# Patient Record
Sex: FEMALE | Race: WHITE | URBAN - METROPOLITAN AREA
[De-identification: names, ages, dates, MRNs, and addresses within clinical notes are randomized per-mention and may not be internally consistent; named-entity substitution may affect disease eponyms.]

---

## 2018-02-27 ENCOUNTER — OFFICE VISIT (OUTPATIENT)
Dept: VASCULAR SURGERY | Facility: CLINIC | Age: 53
End: 2018-02-27
Payer: COMMERCIAL

## 2018-02-27 DIAGNOSIS — Z53.9 ERRONEOUS ENCOUNTER--DISREGARD: Primary | ICD-10-CM

## 2018-02-27 PROCEDURE — 99203 OFFICE O/P NEW LOW 30 MIN: CPT | Performed by: SURGERY

## 2018-02-27 NOTE — PROGRESS NOTES
[facility] Consultation    Alberta De Souza is a 52-year-old female who presents with recurrent right lower extremity pain and varicose veins.  She underwent multiple phlebectomies of her right lower extremity varicose veins on 10/26/2010.  About 2 years ago she noted recurrent pain in the anterolateral right thigh extending down the right lateral leg.  Additionally, she developed swelling of her right ankle toward the end of the day.  She describes her pain as an aching, tiredness and heaviness that is worse when she stands for long periods of time and improves with elevation of legs.  She uses Advil on an as-needed basis for the discomfort.  She has worn compression hose but has not noted significant improvement in her symptoms.  She has suffered complications of superficial thrombophlebitis of the right leg in the past.    She thinks she simply has to ignore the pain and keep going with her day.    She has no history of deep vein thrombosis or superficial thrombophlebitis.  Her family history is significant for varicose veins in her father and her brother.     Past medical history  Medical: Negative  Surgical: Varicose vein surgery as above    Medicines: None    Allergies: Tetracycline    Social history: She works as a , is a non-smoker and does not use alcohol.  She is a mother of 3 sons age 26, 24 and 18 years.    12 point review of systems was completed and was reviewed.  It is significant for a 5 pound weight loss but is otherwise as noted in the history of present illness and past medical history.    Physical exam  General: Pleasant, fit appearing female in no acute distress.  She is 5 feet 9 inches tall weighs 155 pounds  HEENT: Normocephalic, atraumatic.  EOMI.  External ears and nose are normal.  Respiratory: Normal respiratory effort  Cardiovascular: Pulse is regular  Psychiatric: Judgment, insight, mood and affect are normal  Musculoskeletal: Gait and station are normal.  The joints of  her fingers and toes are without deformity.  There is no cyanosis of her nailbeds.  Neurologic: Grossly normal  Extremities: She has 4-6 mm varicosities coursing down the mid to distal lateral thigh, across the lateral right popliteal fossa and onto the lateral right leg.  There are scattered telangiectasias about the right leg below the knee.  There are reticular veins extending up onto the anterolateral right thigh.  In the left lower extremity she has scattered telangiectasias and reticular veins but no significant varicose veins are appreciated.  There are no venous stasis changes.  There is 1+ edema of her right leg but I do not appreciate any edema of the left leg.  She has 3+ dorsalis pedis and posterior tibial pulses bilaterally.    I reviewed her operative report from 10/26/2010 as well as an ultrasound 8/11/2010.  This revealed no evidence of deep vein thrombosis.  The great saphenous vein, small saphenous vein are patent and competent.  There was no evidence of deep vein thrombosis or deep vein valve incompetence.    Impression  Recurrent right lower extremity pain and varicose veins; status post multiple stab phlebectomies 8 years ago.  We discussed options of continued conservative management with use of compression hose with small risk of superficial thrombophlebitis, bleeding or worsening of the varicose veins.    If she wishes to have treatment, she would need an ultrasound of her right lower extremity veins to assess for valve incompetence and to look for the source of these varicosities.  She will think about her options and let us know how she like to proceed.    DANIELLE Allison MD

## 2018-02-27 NOTE — MR AVS SNAPSHOT
"              After Visit Summary   2018    Alberta De Souza    MRN: [id number]           Patient Information     Date Of Birth          1965        Visit Information        Provider Department      2018 2:45 PM Gerson Allison MD [facility]      Today's Diagnoses     ERRONEOUS ENCOUNTER--DISREGARD    -  1       Follow-ups after your visit        Who to contact     If you have questions or need follow up information about today's clinic visit or your schedule please contact [facility] directly at [phone].  Normal or non-critical lab and imaging results will be communicated to you by Signosticshart, letter or phone within 4 business days after the clinic has received the results. If you do not hear from us within 7 days, please contact the clinic through Signosticshart or phone. If you have a critical or abnormal lab result, we will notify you by phone as soon as possible.  Submit refill requests through Sulia or call your pharmacy and they will forward the refill request to us. Please allow 3 business days for your refill to be completed.          Additional Information About Your Visit        MyChart Information     Sulia lets you send messages to your doctor, view your test results, renew your prescriptions, schedule appointments and more. To sign up, go to [email or web address] . Click on \"Log in\" on the left side of the screen, which will take you to the Welcome page. Then click on \"Sign up Now\" on the right side of the page.     You will be asked to enter the access code listed below, as well as some personal information. Please follow the directions to create your username and password.     Your access code is: [email or web address]  Expires: 2018  3:03 PM     Your access code will  in 90 days. If you need help or a new code, please call your [place] clinic or [phone].        Care EveryWhere ID     " This is your Care EveryWhere ID. This could be used by other organizations to access your [place] medical records  [id number]         Blood Pressure from Last 3 Encounters:   No data found for BP    Weight from Last 3 Encounters:   No data found for Wt              Today, you had the following     No orders found for display       Primary Care Provider Fax #    Provider Not In System [phone]                Equal Access to Services     [facility]: Hadii aad ku hadasho Soomaali, waaxda luqadaha, qaybta kaalmada adeegyada, yumiko peace brionnan adekaruna salvadorjohn donald . So [facility] [phone].    ATENCIÓN: Si habla español, tiene a santos disposición servicios gratuitos de asistencia lingüística. Llame al [phone].    We comply with applicable federal civil rights laws and [place] laws. We do not discriminate on the basis of race, color, national origin, age, disability, sex, sexual orientation, or gender identity.            Thank you!     Thank you for choosing [facility]  for your care. Our goal is always to provide you with excellent care. Hearing back from our patients is one way we can continue to improve our services. Please take a few minutes to complete the written survey that you may receive in the mail after your visit with us. Thank you!             Your Updated Medication List - Protect others around you: Learn how to safely use, store and throw away your medicines at www.disposemymeds.org.      Notice  As of 2/27/2018 11:59 PM    You have not been prescribed any medications.

## 2018-03-12 ENCOUNTER — OFFICE VISIT (OUTPATIENT)
Dept: VASCULAR SURGERY | Facility: CLINIC | Age: 53
End: 2018-03-12
Payer: COMMERCIAL

## 2018-03-12 ENCOUNTER — APPOINTMENT (OUTPATIENT)
Dept: VASCULAR SURGERY | Facility: CLINIC | Age: 53
End: 2018-03-12
Payer: COMMERCIAL

## 2018-03-12 DIAGNOSIS — Z53.9 ERRONEOUS ENCOUNTER--DISREGARD: Primary | ICD-10-CM

## 2018-03-12 PROCEDURE — 93971 EXTREMITY STUDY: CPT | Performed by: SURGERY

## 2018-03-12 PROCEDURE — 99213 OFFICE O/P EST LOW 20 MIN: CPT | Performed by: SURGERY

## 2018-03-12 NOTE — MR AVS SNAPSHOT
"              After Visit Summary   3/12/2018    Alberta De Souza    MRN: [id number]           Patient Information     Date Of Birth          1965        Visit Information        Provider Department      3/12/2018 1:30 PM Gerson Allison MD [facility]      Today's Diagnoses     ERRONEOUS ENCOUNTER--DISREGARD    -  1       Follow-ups after your visit        Who to contact     If you have questions or need follow up information about today's clinic visit or your schedule please contact [facility] directly at [phone].  Normal or non-critical lab and imaging results will be communicated to you by Easelhart, letter or phone within 4 business days after the clinic has received the results. If you do not hear from us within 7 days, please contact the clinic through Easelhart or phone. If you have a critical or abnormal lab result, we will notify you by phone as soon as possible.  Submit refill requests through Ratio or call your pharmacy and they will forward the refill request to us. Please allow 3 business days for your refill to be completed.          Additional Information About Your Visit        MyChart Information     Ratio lets you send messages to your doctor, view your test results, renew your prescriptions, schedule appointments and more. To sign up, go to [email or web address] . Click on \"Log in\" on the left side of the screen, which will take you to the Welcome page. Then click on \"Sign up Now\" on the right side of the page.     You will be asked to enter the access code listed below, as well as some personal information. Please follow the directions to create your username and password.     Your access code is: [email or web address]  Expires: 2018  3:03 PM     Your access code will  in 90 days. If you need help or a new code, please call your [place] clinic or [phone].        Care EveryWhere ID     " This is your Care EveryWhere ID. This could be used by other organizations to access your [place] medical records  [id number]         Blood Pressure from Last 3 Encounters:   No data found for BP    Weight from Last 3 Encounters:   No data found for Wt              Today, you had the following     No orders found for display       Primary Care Provider Fax #    Provider Not In System [phone]                Equal Access to Services     [facility]: Hadii aad ku hadasho Soomaali, waaxda luqadaha, qaybta kaalmada adeegyada, yumiko peace brionnan adekrauna salvadorjohn donald . So [facility] [phone].    ATENCIÓN: Si habla español, tiene a santos disposición servicios gratuitos de asistencia lingüística. Llame al [phone].    We comply with applicable federal civil rights laws and [place] laws. We do not discriminate on the basis of race, color, national origin, age, disability, sex, sexual orientation, or gender identity.            Thank you!     Thank you for choosing [facility]  for your care. Our goal is always to provide you with excellent care. Hearing back from our patients is one way we can continue to improve our services. Please take a few minutes to complete the written survey that you may receive in the mail after your visit with us. Thank you!             Your Updated Medication List - Protect others around you: Learn how to safely use, store and throw away your medicines at www.disposemymeds.org.      Notice  As of 3/12/2018 11:59 PM    You have not been prescribed any medications.

## 2018-03-14 NOTE — PROGRESS NOTES
[facility] Progress Note    Alberta De Souza returns in follow-up of right lower extremity pain and recurrent varicose veins.  Please see my consultation of 2/27/2018 for details.  She returned today for right lower extremity duplex ultrasound.    Duplex ultrasound of her right lower extremity deep veins reveals no evidence of deep vein thrombosis.  Her distal right femoral vein is incompetent but the remaining deep vein valves are competent.  Her right great saphenous vein is competent.  The right small saphenous vein is competent except for short segment at the distal calf.  The Vein of Giacomini is an incompetent from the distal thigh to the proximal calf but measures only 1.7 mm in diameter at its largest.  The anterior accessory saphenous vein is not visualized.  There is a 4.1 mm diameter incompetent  10 cm cephalad of the popliteal crease which supplies a 5.1mm incompetent varicosity coursing down the posterior lateral thigh onto the lateral aspect of the leg.    Impression  CEAP3 chronic venous insufficiency right lower extremity.  It appears that the majority of her symptoms originate from the large incompetent  in the posterolateral right thigh.  Due to the limited incompetence of the small saphenous vein and the very small size of the incompetent Vein of Giacomini, I do not feel they are contributing to her symptoms and are not clinically significant.    Options of continued conservative management with use of compression hose with risk of superficial thumb phlebitis, bleeding or worsening of varicose veins was discussed.  If she chose treatment, should be a candidate for radiofrequency ablation of the incompetent perforating vein with and without phlebectomies.  Details and risk were discussed.    She will think about her options and let us know how she like to proceed.    DANIELLE Allison MD

## 2018-04-30 ENCOUNTER — OFFICE VISIT (OUTPATIENT)
Dept: VASCULAR SURGERY | Facility: CLINIC | Age: 53
End: 2018-04-30
Payer: COMMERCIAL

## 2018-04-30 DIAGNOSIS — Z53.9 ERRONEOUS ENCOUNTER--DISREGARD: Primary | ICD-10-CM

## 2018-04-30 PROCEDURE — 99207 ZZC VEINSOLUTIONS NO CHARGE VISIT: CPT | Performed by: SURGERY

## 2018-04-30 NOTE — MR AVS SNAPSHOT
"              After Visit Summary   2018    Alberta De Souza    MRN: [id number]           Patient Information     Date Of Birth          1965        Visit Information        Provider Department      2018 3:00 PM Gerson Allison MD [facility]      Today's Diagnoses     ERRONEOUS ENCOUNTER--DISREGARD    -  1       Follow-ups after your visit        Who to contact     If you have questions or need follow up information about today's clinic visit or your schedule please contact [facility] directly at [phone].  Normal or non-critical lab and imaging results will be communicated to you by Weston Softwarehart, letter or phone within 4 business days after the clinic has received the results. If you do not hear from us within 7 days, please contact the clinic through Weston Softwarehart or phone. If you have a critical or abnormal lab result, we will notify you by phone as soon as possible.  Submit refill requests through LogicLadder or call your pharmacy and they will forward the refill request to us. Please allow 3 business days for your refill to be completed.          Additional Information About Your Visit        MyChart Information     LogicLadder lets you send messages to your doctor, view your test results, renew your prescriptions, schedule appointments and more. To sign up, go to [email or web address] . Click on \"Log in\" on the left side of the screen, which will take you to the Welcome page. Then click on \"Sign up Now\" on the right side of the page.     You will be asked to enter the access code listed below, as well as some personal information. Please follow the directions to create your username and password.     Your access code is: [email or web address]  Expires: 10/28/2018  3:36 PM     Your access code will  in 90 days. If you need help or a new code, please call your [place] clinic or [phone].        Care EveryWhere ID     " This is your Care EveryWhere ID. This could be used by other organizations to access your [place] medical records  [id number]         Blood Pressure from Last 3 Encounters:   No data found for BP    Weight from Last 3 Encounters:   No data found for Wt              Today, you had the following     No orders found for display       Primary Care Provider Fax #    Physician No Ref-Primary [phone]       No address on file        Equal Access to Services     [facility]: Hadii aad ku hadasho Soomaali, waaxda luqadaha, qaybta kaalmada adeegyada, waxay marthain hayaan adeeg staci laSandhyajose . So [facility] [phone].    ATENCIÓN: Si habla español, tiene a santos disposición servicios gratuitos de asistencia lingüística. Llame al [phone].    We comply with applicable federal civil rights laws and [place] laws. We do not discriminate on the basis of race, color, national origin, age, disability, sex, sexual orientation, or gender identity.            Thank you!     Thank you for choosing [facility]  for your care. Our goal is always to provide you with excellent care. Hearing back from our patients is one way we can continue to improve our services. Please take a few minutes to complete the written survey that you may receive in the mail after your visit with us. Thank you!             Your Updated Medication List - Protect others around you: Learn how to safely use, store and throw away your medicines at www.disposemymeds.org.      Notice  As of 4/30/2018 11:59 PM    You have not been prescribed any medications.

## 2018-04-30 NOTE — PROGRESS NOTES
"2018    [facility]   Individual  [employer]  [address] [address]  [place]  [address]    RE:  Alberta De Souza   1965      To Whom It May Concern:    I am writing regarding Alberta De Souza who was denied coverage for treatment of recurrent, symptomatic right lower extremity varicose veins.  Phlebectomies were performed in the same distribution as the recurrent varicose veins from which she is suffering presently.  Dictations from 2018 and 3/12/18 detail her symptoms.      It is clear based on her duplex ultrasound that a 4.1 mm incompetent perforating vein in the distal posterior lateral right thigh, 10 cm cephalad of the knee crease, is the source of the recurrent varicose veins and her leg pain.  Treatment was denied because \"varicose tributaries are considered experimental and investigational for this procedure.  Clinical studies have not proven that this procedure is effective for treatment of the members condition.\"    If the incompetent  is not treated, her symptoms will not be relieved and she will simply have recurrence of her varicose veins..  My philosophy is to try to perform one good operation so that the patient sees me the least number of times possible.  Obviously, there are no clinical studies proving the effectiveness of closing the incompetent  in this particular situation, but with 25 years of experience, I have no doubt the procedure will be clinically effective and will relieve her symptoms.    Preventing unethical practitioners from performing unnecessary operations is a difficult task.  Distinguishing between ethical  And unethical physicians is equally, if not more, difficult.  I applaud you and your efforts and do not envy your task.  To distinguish ourselves from other practices, my partners and I have recently been recognized by the [facility] as an accredited vein center and an accredited vascular laboratory.  You can see from the " ultrasound this patient has an incompetent Vein of Giacomini and an incompetent distal small saphenous vein but I do not feel either of these need to be treated as they are not the source of her symptoms or her varicose veins.  I am simply trying to do the right thing for this patient.    I appreciate your review of my request for coverage for radiofrequency ablation of the sizable incompetent perforating vein of her right leg.  I would be more than happy to discuss this case with you if you deem necessary.    Sincerely,    JUAN F Allison MD, FACS